# Patient Record
(demographics unavailable — no encounter records)

---

## 2024-12-12 NOTE — ADDENDUM
[FreeTextEntry1] :   Patient presented for RSV vaccine (Abrysvo) vaccination as ordered by Dr. Abebe Prior to administration, reviewed Abrysvo VIS with patient who verbalized understanding.     Patient denied severe allergic reaction to previous vaccinations.  Manufacture:[employer] NDC:[id number] Exp:06/01/1015 Lot:[id number]   Patient tolerated vaccination well on Left  Deltoid. No immediate adverse reaction noted. Patient provided with VIS for home review as per protocol.   Juana Denney RN

## 2024-12-12 NOTE — HEALTH RISK ASSESSMENT
[Yes] : Yes [Monthly or less (1 pt)] : Monthly or less (1 point) [1 or 2 (0 pts)] : 1 or 2 (0 points) [Never (0 pts)] : Never (0 points) [No falls in past year] : Patient reported no falls in the past year [0] : 2) Feeling down, depressed, or hopeless: Not at all (0) [PHQ-2 Negative - No further assessment needed] : PHQ-2 Negative - No further assessment needed [Never] : Never [Patient reported mammogram was normal] : Patient reported mammogram was normal [Patient reported PAP Smear was normal] : Patient reported PAP Smear was normal [Patient reported bone density results were normal] : Patient reported bone density results were normal [Patient reported colonoscopy was normal] : Patient reported colonoscopy was normal [Fully functional (bathing, dressing, toileting, transferring, walking, feeding)] : Fully functional (bathing, dressing, toileting, transferring, walking, feeding) [Fully functional (using the telephone, shopping, preparing meals, housekeeping, doing laundry, using] : Fully functional and needs no help or supervision to perform IADLs (using the telephone, shopping, preparing meals, housekeeping, doing laundry, using transportation, managing medications and managing finances) [[id number]] : 0 [MammogramDate] : 05/24 [PapSmearDate] : 03/23 [BoneDensityDate] : 03/21 [ColonoscopyDate] : 01/17